# Patient Record
Sex: FEMALE | Race: WHITE | URBAN - METROPOLITAN AREA
[De-identification: names, ages, dates, MRNs, and addresses within clinical notes are randomized per-mention and may not be internally consistent; named-entity substitution may affect disease eponyms.]

---
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
## 2025-02-28 ENCOUNTER — TELEPHONE (OUTPATIENT)
Dept: FAMILY MEDICINE CLINIC | Facility: CLINIC | Age: 20
End: 2025-02-28

## 2025-02-28 DIAGNOSIS — E78.5 ELEVATED LIPIDS: Primary | ICD-10-CM

## 2025-03-03 DIAGNOSIS — H02.059 TRICHIASIS, UNSPECIFIED LATERALITY: ICD-10-CM

## 2025-03-03 RX ORDER — BIMATOPROST 3 UG/ML
SOLUTION TOPICAL
Qty: 3 ML | Refills: 0 | Status: SHIPPED | OUTPATIENT
Start: 2025-03-03

## 2025-07-14 ENCOUNTER — OFFICE VISIT (OUTPATIENT)
Dept: FAMILY MEDICINE CLINIC | Facility: CLINIC | Age: 20
End: 2025-07-14
Payer: COMMERCIAL

## 2025-07-14 VITALS
WEIGHT: 127.5 LBS | TEMPERATURE: 98 F | HEIGHT: 62.5 IN | OXYGEN SATURATION: 99 % | SYSTOLIC BLOOD PRESSURE: 122 MMHG | HEART RATE: 107 BPM | BODY MASS INDEX: 22.87 KG/M2 | DIASTOLIC BLOOD PRESSURE: 82 MMHG | RESPIRATION RATE: 18 BRPM

## 2025-07-14 DIAGNOSIS — R51.9 GENERALIZED HEADACHES: Primary | ICD-10-CM

## 2025-07-14 PROCEDURE — 99213 OFFICE O/P EST LOW 20 MIN: CPT

## 2025-07-14 PROCEDURE — 3079F DIAST BP 80-89 MM HG: CPT

## 2025-07-14 PROCEDURE — 3008F BODY MASS INDEX DOCD: CPT

## 2025-07-14 PROCEDURE — 3074F SYST BP LT 130 MM HG: CPT

## 2025-07-14 RX ORDER — NAPROXEN 500 MG/1
500 TABLET ORAL 2 TIMES DAILY WITH MEALS
Qty: 60 TABLET | Refills: 0 | Status: SHIPPED | OUTPATIENT
Start: 2025-07-14

## 2025-07-14 NOTE — PROGRESS NOTES
Chief Complaint   Patient presents with    Headache     Since X 1 weeks          HPI  Milagro Feliciano is a 20 year old F pt who presents today for headaches for a week    Pt reports headaches that started shortly after getting a new cat. The headaches are described as a constant sinus pressure, primarily located in the frontal region of her head. She rates the pain as a 3/10 at baseline, increasing to 6/10 at its worst. The patient denies congestion, runny nose, or skin reactions typically associated with allergies.    The headache is exacerbated by lifting heavy objects and tends to spread to her whole head when severe. Pt reports feeling dizzy at work when the headache is particularly bad. She also mentions feeling like her head is \"always weighing down\" and that she has a slow reaction time. The patient has been unable to drive due to these symptoms. She has been taking Zyrtec, which provides some relief. She also notes that applying pressure to the affected area helps relieve the pain.    Pt denies associated symptoms such as tearing of the eyes, neck or shoulder pain, light or sound sensitivity, nausea, vomiting, ear pain, or throat issues. The patient mentions a history of allergies and states she has previously been diagnosed as allergic to cats, though her reactions vary with different cats.      ROS  As per HPI    Past Medical History[1]    Past Surgical History[2]    Social Hx on file[3]    Family History[4]     Medications Ordered Prior to Encounter[5]      Objective  Vitals:    07/14/25 1647   BP: 122/82   Pulse: 107   Resp: 18   Temp: 98.3 °F (36.8 °C)   SpO2: 99%   Weight: 127 lb 8 oz (57.8 kg)   Height: 5' 2.5\" (1.588 m)     Body mass index is 22.95 kg/m².    Physical Exam  Constitutional:       Appearance: Normal appearance.   HEENT:      Head: Normocephalic and atraumatic. Frontal and maxillary sinus NTTP     Eyes: PERRLA no notable nystagmus     Ears: normal on observation. TM clear b/l     Nose:  Nose normal.      Mouth: Mucous membranes are moist.      Neck: no lymphadenopathy  Cardiovascular:      Rate and Rhythm: Normal rate and regular rhythm.   Pulmonary:      Effort: Pulmonary effort is normal.      Breath sounds: Normal breath sounds.   Musculoskeletal:         General: Normal range of motion.      Cervical back: Normal range of motion.   Skin:     General: Skin is warm and dry.   Neurological:      General: No focal deficit present.      Mental Status: Alert and oriented to person, place, and time.   Psychiatric:         Mood and Affect: Mood normal.         Thought Content: Thought content normal.       Assessment and Plan  Milagro was seen today for headache.    Diagnoses and all orders for this visit:    Generalized headaches  -     naproxen 500 MG Oral Tab; Take 1 tablet (500 mg total) by mouth 2 (two) times daily with meals.       - Start naproxen twice/day PRN for headache, to be taken with food and water  - Recommend continuation of Zyrtec as needed for allergy symptoms  - Advise patient to temporarily remove the cat from the home environment for a few days to assess if symptoms improve  - Encouraged increased water intake to prevent dehydration-induced headaches  - Monitor and document any new symptoms  - Follow up in 4 weeks   - If symptoms persist or worsen, consider further evaluation or alternative treatment options      Follow up  Return in about 4 weeks (around 8/11/2025) for headaches.      Patient Instructions  Patient Instructions   -Start naproxen twice/day PRN for headache, to be taken with food and water  - Recommend continuation of Zyrtec as needed for allergy symptoms  - Remove the cat from the home environment for a few days to assess if symptoms improve  - Encourage increased water intake to prevent dehydration-induced headaches  - Monitor and document any new symptoms       Shana Muller MD          [1] History reviewed. No pertinent past medical history.  [2] History  reviewed. No pertinent surgical history.  [3]   Social History  Socioeconomic History    Marital status: Single    Highest education level: High school graduate   Tobacco Use    Smoking status: Never    Smokeless tobacco: Never   Vaping Use    Vaping status: Never Used   Substance and Sexual Activity    Alcohol use: No    Drug use: No   [4]   Family History  Problem Relation Age of Onset    Thyroid disease Mother     Thyroid disease Cousin     Diabetes Neg    [5]   Current Outpatient Medications on File Prior to Visit   Medication Sig Dispense Refill    Bimatoprost 0.03 % External Solution USE 1 DROP IN RIGHT AND LEFT EYE NIGHTLY 3 mL 0     No current facility-administered medications on file prior to visit.

## 2025-07-17 NOTE — PATIENT INSTRUCTIONS
-Start naproxen twice/day PRN for headache, to be taken with food and water  - Recommend continuation of Zyrtec as needed for allergy symptoms  - Remove the cat from the home environment for a few days to assess if symptoms improve  - Encourage increased water intake to prevent dehydration-induced headaches  - Monitor and document any new symptoms

## (undated) NOTE — LETTER
State of [facility] [employer] of Child Health Examination       Student's Name  Ángel Barroso Birth Armin Title                           Date     Signature                                                                                                                                              Title                           Date    (If adding dates to the ALLERGIES  (Food, drug, insect, other)  Patient has no known allergies. MEDICATION  (List all prescribed or taken on a regular basis.)  No current outpatient medications on file. Diagnosis of asthma?   Child wakes during the night coughing     No      N Family History No    Ethnic Minority  No          Signs of Insulin Resistance (hypertension, dyslipidemia, polycystic ovarian syndrome, acanthosis nigricans)    No           At Risk  No   Lead Risk Questionnaire  Req'd for children 6 months thru 6 yrs whitleyro Controller medication (e.g. inhaled corticosteroid):   No Other   NEEDS/MODIFICATIONS required in the school setting  None DIETARY Needs/Restrictions     None   SPECIAL INSTRUCTIONS/DEVICES e.g. safety glasses, glass eye, chest protector for arrhyt

## (undated) NOTE — LETTER
VACCINE ADMINISTRATION RECORD  PARENT / GUARDIAN APPROVAL  Date: 2022  Vaccine administered to: Lennox Peace     : 2005    MRN: [id number]    A copy of the appropriate Centers for Disease Control and Prevention Vaccine Information statement has been provided. I have read or have had explained the information about the diseases and the vaccines listed below. There was an opportunity to ask questions and any questions were answered satisfactorily. I believe that I understand the benefits and risks of the vaccine cited and ask that the vaccine(s) listed below be given to me or to the person named above (for whom I am authorized to make this request). VACCINES ADMINISTERED:  Menveo:2ND DOSE    I have read and hereby agree to be bound by the terms of this agreement as stated above. My signature is valid until revoked by me in writing. This document is signed by             Chandler Amado                              , relationship: Mother on 2022.:                                                                                                                                         Parent / Marisol Cosby                                                Date    Tod Payne served as a witness to authentication that the identity of the person signing electronically is in fact the person represented as signing. This document was generated by Roberto Delong MA on 2022.

## (undated) NOTE — LETTER
July 24, 2017  2  Parents of:  Darcy Wilson [id number]      Dear Wyatt Briceño: The following are the results of your recent tests. Please review the list of test results.   Your result is the value on the left; we have also suppl Monocyte % 7.9 %   Eosinophil % 5.9 %   Basophil % 1.1 %   Immature Granulocyte % 0.2 %       Please call if you have further questions,    [phone]

## (undated) NOTE — LETTER
Date: 7/19/2024    Patient Name: Milagro Feliciano          To Whom it may concern:    The above patient was seen at [facility] for treatment of a medical condition.        The patient cannot lift over 15 lbs for 4 weeks . The patient will be starting physical therapy.      Sincerely,        Ana Rosa RICHARDS

## (undated) NOTE — MR AVS SNAPSHOT
[address] [place] [id number] [phone]               Thank you for choosing us for your health care visit with Carolina Pimentel MD.  We are glad to serve you and happy to provide you with this summary of your v An initiative of the American Academy of Pediatrics    Fact Sheet: Healthy Active Living for Families    Healthy nutrition starts as early as infancy with breastfeeding.  Once your baby begins eating solid foods, introduce nutritious foods early on and ofte